# Patient Record
Sex: MALE | Race: BLACK OR AFRICAN AMERICAN | NOT HISPANIC OR LATINO | Employment: FULL TIME | URBAN - METROPOLITAN AREA
[De-identification: names, ages, dates, MRNs, and addresses within clinical notes are randomized per-mention and may not be internally consistent; named-entity substitution may affect disease eponyms.]

---

## 2019-08-05 ENCOUNTER — TELEPHONE (OUTPATIENT)
Dept: INTERNAL MEDICINE | Facility: CLINIC | Age: 19
End: 2019-08-05

## 2019-08-05 ENCOUNTER — TELEPHONE (OUTPATIENT)
Dept: PRIMARY CARE CLINIC | Facility: CLINIC | Age: 19
End: 2019-08-05

## 2019-08-05 NOTE — TELEPHONE ENCOUNTER
----- Message from Brunilda Bolden sent at 8/5/2019  1:41 PM CDT -----  Contact: self [phone]  Pt would like an appt for HIV Prep. Pt has had the follow symptoms within the last 3 weeks, diarrhea, fever, sore throat, and rash. Please call back at [phone]//thank you acc

## 2019-08-05 NOTE — TELEPHONE ENCOUNTER
Pt states he has [facility] as his primary  His secondary is [facility]  Pt states his boyfriend is hiv positive and he has been recently tested and is negative  Pt was told we will call him back with a phone number for the community clinic and I will ask if the accept the [facility] secondary or he has to wait til the  can check

## 2019-08-05 NOTE — TELEPHONE ENCOUNTER
----- Message from Brunilda Bolden sent at 8/5/2019  1:41 PM CDT -----  Contact: self [phone]  Pt would like an appt for HIV Prep. Pt has had the follow symptoms within the last 3 weeks, diarrhea, fever, sore throat, and rash. Please call back at [phone]//thank you acc

## 2019-08-05 NOTE — TELEPHONE ENCOUNTER
Spoke with pt to get more information on scheduling. Pt was given information to sign up on [facility]. Pt agreed to see Dr. Arzate at the prep clinic on 8/9/19. Understanding voiced.

## 2019-08-05 NOTE — TELEPHONE ENCOUNTER
----- Message from Amie Steele sent at 8/5/2019  1:53 PM CDT -----  Contact: [phone]/self  Type:  Patient Returning Call    Who Called: pt  Who Left Message for Patient: Winifred  Does the patient know what this is regarding?: appt scheduling  Would the patient rather a call back or a response via [facility]? Call back  Best Call Back Number: [phone]  Additional Information:

## 2019-08-06 ENCOUNTER — TELEPHONE (OUTPATIENT)
Dept: INTERNAL MEDICINE | Facility: CLINIC | Age: 19
End: 2019-08-06

## 2019-08-06 ENCOUNTER — PATIENT MESSAGE (OUTPATIENT)
Dept: INTERNAL MEDICINE | Facility: CLINIC | Age: 19
End: 2019-08-06

## 2019-08-06 NOTE — TELEPHONE ENCOUNTER
----- Message from Brunilda Bolden sent at 8/5/2019  1:41 PM CDT -----  Contact: self [phone]  Pt would like an appt for HIV Prep. Pt has had the follow symptoms within the last 3 weeks, diarrhea, fever, sore throat, and rash. Please call back at [phone]//thank you acc

## 2019-08-09 ENCOUNTER — OFFICE VISIT (OUTPATIENT)
Dept: PRIMARY CARE CLINIC | Facility: CLINIC | Age: 19
End: 2019-08-09
Payer: MEDICAID

## 2019-08-09 ENCOUNTER — TELEPHONE (OUTPATIENT)
Dept: PHARMACY | Facility: CLINIC | Age: 19
End: 2019-08-09

## 2019-08-09 ENCOUNTER — LAB VISIT (OUTPATIENT)
Dept: LAB | Facility: HOSPITAL | Age: 19
End: 2019-08-09
Attending: INTERNAL MEDICINE
Payer: MEDICAID

## 2019-08-09 VITALS
HEIGHT: 71 IN | WEIGHT: 198.38 LBS | SYSTOLIC BLOOD PRESSURE: 100 MMHG | BODY MASS INDEX: 27.77 KG/M2 | HEART RATE: 72 BPM | DIASTOLIC BLOOD PRESSURE: 64 MMHG

## 2019-08-09 DIAGNOSIS — Z00.00 BLOOD TESTS FOR ROUTINE GENERAL PHYSICAL EXAMINATION: ICD-10-CM

## 2019-08-09 DIAGNOSIS — Z20.6 CONTACT WITH AND (SUSPECTED) EXPOSURE TO HUMAN IMMUNODEFICIENCY VIRUS (HIV): ICD-10-CM

## 2019-08-09 DIAGNOSIS — Z01.812 PRE-OPERATIVE LABORATORY EXAMINATION: ICD-10-CM

## 2019-08-09 DIAGNOSIS — Z11.3 ENCOUNTER FOR SCREENING EXAMINATION FOR SEXUALLY TRANSMITTED DISEASE: ICD-10-CM

## 2019-08-09 DIAGNOSIS — Z20.6 CONTACT WITH AND (SUSPECTED) EXPOSURE TO HUMAN IMMUNODEFICIENCY VIRUS (HIV): Primary | ICD-10-CM

## 2019-08-09 LAB
ALBUMIN SERPL BCP-MCNC: 4.5 G/DL (ref 3.5–5.2)
ALP SERPL-CCNC: 57 U/L (ref 55–135)
ALT SERPL W/O P-5'-P-CCNC: 10 U/L (ref 10–44)
ANION GAP SERPL CALC-SCNC: 8 MMOL/L (ref 8–16)
AST SERPL-CCNC: 15 U/L (ref 10–40)
BILIRUB SERPL-MCNC: 0.5 MG/DL (ref 0.1–1)
BUN SERPL-MCNC: 12 MG/DL (ref 6–20)
CALCIUM SERPL-MCNC: 9.8 MG/DL (ref 8.7–10.5)
CHLORIDE SERPL-SCNC: 103 MMOL/L (ref 95–110)
CO2 SERPL-SCNC: 30 MMOL/L (ref 23–29)
CREAT SERPL-MCNC: 0.9 MG/DL (ref 0.5–1.4)
EST. GFR  (AFRICAN AMERICAN): >60 ML/MIN/1.73 M^2
EST. GFR  (NON AFRICAN AMERICAN): >60 ML/MIN/1.73 M^2
GLUCOSE SERPL-MCNC: 77 MG/DL (ref 70–110)
POTASSIUM SERPL-SCNC: 4.4 MMOL/L (ref 3.5–5.1)
PROT SERPL-MCNC: 7.8 G/DL (ref 6–8.4)
SODIUM SERPL-SCNC: 141 MMOL/L (ref 136–145)

## 2019-08-09 PROCEDURE — 99213 OFFICE O/P EST LOW 20 MIN: CPT | Mod: PBBFAC,PN | Performed by: INTERNAL MEDICINE

## 2019-08-09 PROCEDURE — 99385 PR PREVENTIVE VISIT,NEW,18-39: ICD-10-PCS | Mod: S$PBB,,, | Performed by: INTERNAL MEDICINE

## 2019-08-09 PROCEDURE — 86803 HEPATITIS C AB TEST: CPT

## 2019-08-09 PROCEDURE — 87340 HEPATITIS B SURFACE AG IA: CPT

## 2019-08-09 PROCEDURE — 87591 N.GONORRHOEAE DNA AMP PROB: CPT | Mod: 91

## 2019-08-09 PROCEDURE — 36415 COLL VENOUS BLD VENIPUNCTURE: CPT | Mod: PN

## 2019-08-09 PROCEDURE — 86703 HIV-1/HIV-2 1 RESULT ANTBDY: CPT

## 2019-08-09 PROCEDURE — 80053 COMPREHEN METABOLIC PANEL: CPT

## 2019-08-09 PROCEDURE — 99385 PREV VISIT NEW AGE 18-39: CPT | Mod: S$PBB,,, | Performed by: INTERNAL MEDICINE

## 2019-08-09 PROCEDURE — 99999 PR PBB SHADOW E&M-EST. PATIENT-LVL III: ICD-10-PCS | Mod: PBBFAC,,, | Performed by: INTERNAL MEDICINE

## 2019-08-09 PROCEDURE — 99999 PR PBB SHADOW E&M-EST. PATIENT-LVL III: CPT | Mod: PBBFAC,,, | Performed by: INTERNAL MEDICINE

## 2019-08-09 RX ORDER — EMTRICITABINE AND TENOFOVIR DISOPROXIL FUMARATE 200; 300 MG/1; MG/1
1 TABLET, FILM COATED ORAL DAILY
Qty: 90 TABLET | Refills: 0 | Status: SHIPPED | OUTPATIENT
Start: 2019-08-09 | End: 2019-11-07

## 2019-08-09 RX ORDER — PENICILLIN G BENZATHINE 1200000 [IU]/2ML
INJECTION, SUSPENSION INTRAMUSCULAR
Refills: 2 | COMMUNITY
Start: 2019-08-02

## 2019-08-09 RX ORDER — MUPIROCIN 20 MG/G
OINTMENT TOPICAL
Refills: 1 | COMMUNITY
Start: 2019-07-11

## 2019-08-09 NOTE — PROGRESS NOTES
"HIV PrEP - FIRST VISIT CONSULT NOTE     SUBJECTIVE:    This is a/an 18 y.o. male here for primary care visit for  Chief Complaint   Patient presents with    Annual Exam     first visit PREP     General Sexual Behavior Evaluation  · Patient Clinic Questionnaire results reviewed today and scanned into chart "media" tab for review  · Patient states that he has never been on HIV pre exposure prophylaxis.  States that he 2 week ago was evaluated by his primary care provider for rash on his thighs and was tested for syphilis and tested positive.  Patient received his 1st dosage of Bicillin August 1st without adverse reactions.  States that he is due to get his 2nd injection soon.  States that he has his treatment scheduled with his primary care provider Matt before he travels to [place] where he will reside for the next 6 months or more.  Patient is interested to start prep before he travels and agrees to be in close communication regarding any adverse effects from the medication.  States that he does not have any underlying renal issues.  The patient received a urine gonorrhea/chlamydia test but did not receive pharyngeal or rectal testing.  · Patient was given referral information to several prep providers in his [place] community where he is to relocate soon.    STD History  · Detailed HIV/STI risk reduction counseling completed today Deferred      Condom Use  · Detailed counseling relating to condom use completed today Deferred   · Patient states that in the past 4 weeks there has not been condomless sex.      Substance History  · Detailed counseling relating to substance use to decrease HIV/STI risk completed today Deferred      Medications Reviewed and Updated    Past medical, family, and social histories were reviewed and updated.    Review of Systems negative unless noted otherwise in history of present illness-  ROS    Allergic:  Review of patient's allergies indicates:  No Known " Allergies    OBJECTIVE:  BP: 100/64 Pulse: 72    Wt Readings from Last 3 Encounters:   08/09/19 90 kg (198 lb 5.6 oz) (91 %, Z= 1.36)*     * Growth percentiles are based on CDC (Boys, 2-20 Years) data.    Body mass index is 27.66 kg/m².  Previous Blood Pressure Readings :   BP Readings from Last 3 Encounters:   08/09/19 100/64       Physical Exam    GEN: No apparent distress  HEENT: oropharynx clear   CV: no peripheral edema  PULM: breathing non-labored  NEURO: cn ii-xii grossly intact, normal gait   SKIN: no visible rashes on the palms or exposed extremities  : Deferred      Pertinent Labs Reviewed       ASSESSMENT/PLAN:    HIV PrEP Initial Evaluation   - this patient meets clinical criteria for PrEP services as documented above.   - medical contraindications for starting/continuing Truvada HIV PrEP have been reviewed  - acute HIV Infection Symptoms were not present within last 4 weeks.   - patient does not meet criteria for HIV post-exposure prophylaxis.   - patient has engaged in an informed consent process for HIV PrEP initiation or continuation.   - patient voices clear preference to start PrEP in favor of alternative methods   PLAN  - comprehensive counseling on PrEP given today  - 4th generation HIV assay with confirmatory testing as indicated.   - order renal function test  - order HBsAg/anti-HBs/Total antiHBc/IgM antiHBc  - order HCV Antibody  - order RPR   - New Rx for PrEP will not be dispensed to patient until HIV labs are fully resulted as negative. Inconclusive results will prompt further testing and we will contact the patient to give further instructions.       STI Management Plan  - patient was screened for STI signs and symptoms today  PLAN   - appropriate action will be taken based on the findings.   - patient counseled on safer sex methods today  - HAV/HBV/HPV vaccines reviewed for completeness no    HIV PrEP Patient  -     HIV 1/2 Ag/Ab (4th Gen); Standing    Encounter for screening  examination for sexually transmitted disease  -     RPR; Standing  -     Misc. C trach/N gonor Amplified RNA Throat; Standing  -     Misc. C trach/N gonor Amplified RNA Rectal; Standing  -     C. trachomatis/N. gonorrhoeae by AMP DNA; Standing  -     Hepatitis C antibody; Standing  -     Hepatitis B surface antigen; Future; Expected date: 08/09/2019    Pre-operative laboratory examination  -     Comprehensive metabolic panel; Standing  -     Urinalysis; Standing    Blood tests for routine general physical examination  -     HIV 1/2 Ag/Ab (4th Gen); Standing  -     Comprehensive metabolic panel; Standing  -     Urinalysis; Standing    Other orders  -     emtricitabine-tenofovir 200-300 mg (TRUVADA) 200-300 mg Tab; Take 1 tablet by mouth once daily. For HIV PrEP. Generic acceptable.Hold fill for negative HIV test  Dispense: 90 tablet; Refill: 0        Future Appointments   Date Time Provider Department Center   8/9/2019  1:30 PM LAB, [facility] Brody Arzate  8/9/2019  12:20 PM

## 2019-08-09 NOTE — TELEPHONE ENCOUNTER
Informed Patient  that [facility] received prescription for Truvada and benefits investigation is required.  [facility] will be back in touch once insurance determination is received.

## 2019-08-12 LAB
C TRACH RRNA SPEC QL NAA+PROBE: NEGATIVE
C TRACH RRNA SPEC QL NAA+PROBE: POSITIVE
C.TRACH RNA SOURCE: ABNORMAL
C.TRACH RNA SOURCE: NORMAL
HBV SURFACE AG SERPL QL IA: NEGATIVE
HCV AB SERPL QL IA: NEGATIVE
HIV 1+2 AB+HIV1 P24 AG SERPL QL IA: NEGATIVE
N GONORRHOEA RRNA SPEC QL NAA+PROBE: NEGATIVE
N GONORRHOEA RRNA SPEC QL NAA+PROBE: NEGATIVE
N.GONORROHEAE, AMP RNA SOURCE: ABNORMAL
N.GONORROHEAE, AMP RNA SOURCE: NORMAL

## 2019-08-13 ENCOUNTER — TELEPHONE (OUTPATIENT)
Dept: INTERNAL MEDICINE | Facility: CLINIC | Age: 19
End: 2019-08-13

## 2019-08-13 ENCOUNTER — PATIENT MESSAGE (OUTPATIENT)
Dept: INTERNAL MEDICINE | Facility: CLINIC | Age: 19
End: 2019-08-13

## 2019-08-13 NOTE — TELEPHONE ENCOUNTER
Patient called for initial consult and ship on Truvada as PrEP.  Patient asked for a call back at 4:30pm 8/13 to complete initial and ship medication.     KINA Krause.Ph., AAHIVP  Clinical Pharmacist, HIV/HCV  [facility]  Phone: [phone]

## 2019-08-13 NOTE — TELEPHONE ENCOUNTER
Patient plans to start Truvada on 8/15.  Truvada counseling complete. Name/ confirmed. Consult included: indication; goals of treatment; administration; storage and handling; side effects; how to handle missed doses; the importance of compliance; the importance of maintaining lab appts and follow up appts w\ MD; safe sex practices with condoms. Patient understands that medication alone is not primary HIV prophylaxis. Patient understands this medication will only serve as protection for HIV and no other STDs. Patient understands to contact [facility] and MD for any medication changes due to drug interactions. He understands the importance of 3month regular testing. He understands the importance of ONCE daily dosing due to the potential renal effects of tenofovir. All questions answered and addressed to the patients satisfaction.       Discussed the importance of staying well hydrated while on therapy. Compliance stressed - patient to take missed doses as soon as remembered, but NOT to take 2 doses in one day. Patient will report questions or concerns to myself or practitioner. Patient verbalizes understanding. I will personally f/u with patient in 2 weeks from start, and [facility] will contact patient in 3 weeks to coordinate next refill.        Patient plans to start Truvada on 8/15. (please schedule labs accordingly)  Approximately 40 mins spent with the patient on medication education.      Thank you for allowing us to participate in the care of your patient     Patient had concerns regarding being in a serodischordant relationship. These were discussed and addressed.  Patient likewise has concerns about his relocating out of state and to an area that is outside of the service area for [facility].  These were discussed and addressed and plans for dealing with this move specifically will be enumerated as the move comes closer.     Alan Penaloza, R.Ph., [facility]  Clinical Pharmacist, HIV/HCV  [facility]  Phone: [phone]

## 2019-08-13 NOTE — TELEPHONE ENCOUNTER
Documentation Only:     Prior Authorization for Truvada IS NOT required     Patient co-pay: $0     Patient Assistance IS NOT required.     Forwarding to clinical pharmacist for consult and shipment. AKF

## 2019-08-14 ENCOUNTER — CLINICAL SUPPORT (OUTPATIENT)
Dept: PRIMARY CARE CLINIC | Facility: CLINIC | Age: 19
End: 2019-08-14
Payer: MEDICAID

## 2019-08-14 DIAGNOSIS — A74.9 CHLAMYDIA: Primary | ICD-10-CM

## 2019-08-14 RX ORDER — AZITHROMYCIN 500 MG/1
1000 TABLET, FILM COATED ORAL ONCE
Qty: 2 TABLET | Refills: 0 | Status: CANCELLED | OUTPATIENT
Start: 2019-08-14 | End: 2019-08-14

## 2019-08-14 RX ORDER — AZITHROMYCIN 250 MG/1
1000 TABLET, FILM COATED ORAL ONCE
Status: COMPLETED | OUTPATIENT
Start: 2019-08-14 | End: 2019-08-14

## 2019-08-14 RX ADMIN — AZITHROMYCIN 1000 MG: 250 TABLET, FILM COATED ORAL at 12:08

## 2019-08-14 NOTE — PROGRESS NOTES
Administered Azithromycin 500mg 2 tabs po x1 dose to patient in clinic (equaling 1000mg as per order from Dr. Arzate) (LOT: [id number], exp: 03/2020). Patient was instructed to stay for 15-20 minutes to monitor for nausea and to contact the clinic should he become nauseated and vomit, as he will need repeat treatment. Patient tolerated well with no side effects. He was discharged in stable condition.

## 2019-08-14 NOTE — TELEPHONE ENCOUNTER
----- Message from Luis F Arzate MD sent at 8/13/2019 11:46 AM CDT -----  Patient needs 1000mg single dose of Azithromycin in clinic, nurse visit. Sign orders on my behalf and send to me for cosign. If patient is not answering the phone or would strongly prefer to get this medication Rx to a local pharmacy, I may consider it but this is not my preferred action is it limits my ability to know for sure he took his medicine.

## 2019-08-16 ENCOUNTER — TELEPHONE (OUTPATIENT)
Dept: INTERNAL MEDICINE | Facility: CLINIC | Age: 19
End: 2019-08-16

## 2019-09-03 ENCOUNTER — TELEPHONE (OUTPATIENT)
Dept: PHARMACY | Facility: CLINIC | Age: 19
End: 2019-09-03

## 2019-09-03 NOTE — TELEPHONE ENCOUNTER
Patient called for refill readiness and follow up on Truvada as PrEP.  No answer - lvm for call back.     Waldemar Miller, Pharm.D.  Pharmacy Resident, PGY-1   [facility]

## 2019-09-03 NOTE — TELEPHONE ENCOUNTER
Truvada refill confirmed. Truvada to be shipped on  for delivery .   $0 copay- 004. Confirmed 2 patient identifiers - name and .      Patient reports taking Truvada routinely and has about 10 doses on hand.  No side effects noted.  No missed doses, no new medications, no new allergies or health conditions reported at this time. Patient denies any recent trip to ER or Urgent Care.  Patient also denies any pain. All questions answered and addressed to patients satisfaction. Pt verbalized understanding.

## 2019-10-30 ENCOUNTER — PATIENT MESSAGE (OUTPATIENT)
Dept: PHARMACY | Facility: CLINIC | Age: 19
End: 2019-10-30

## 2019-11-06 ENCOUNTER — PATIENT MESSAGE (OUTPATIENT)
Dept: PRIMARY CARE CLINIC | Facility: CLINIC | Age: 19
End: 2019-11-06

## 2019-11-07 ENCOUNTER — TELEPHONE (OUTPATIENT)
Dept: INTERNAL MEDICINE | Facility: CLINIC | Age: 19
End: 2019-11-07

## 2019-11-08 NOTE — TELEPHONE ENCOUNTER
PrEP follow up needed. 11/15 [facility] for patient. Please do HIV, CMP, RPR 5-7 days prior to visit.

## 2019-11-15 ENCOUNTER — TELEPHONE (OUTPATIENT)
Dept: PRIMARY CARE CLINIC | Facility: CLINIC | Age: 19
End: 2019-11-15

## 2019-11-15 ENCOUNTER — PATIENT MESSAGE (OUTPATIENT)
Dept: PHARMACY | Facility: CLINIC | Age: 19
End: 2019-11-15

## 2021-04-15 ENCOUNTER — PATIENT MESSAGE (OUTPATIENT)
Dept: RESEARCH | Facility: HOSPITAL | Age: 21
End: 2021-04-15